# Patient Record
Sex: FEMALE | Race: OTHER | Employment: FULL TIME | URBAN - METROPOLITAN AREA
[De-identification: names, ages, dates, MRNs, and addresses within clinical notes are randomized per-mention and may not be internally consistent; named-entity substitution may affect disease eponyms.]

---

## 2017-05-05 ENCOUNTER — OFFICE VISIT (OUTPATIENT)
Dept: FAMILY MEDICINE CLINIC | Age: 42
End: 2017-05-05

## 2017-05-05 ENCOUNTER — HOSPITAL ENCOUNTER (OUTPATIENT)
Dept: LAB | Age: 42
Discharge: HOME | End: 2017-05-05
Payer: OTHER GOVERNMENT

## 2017-05-05 VITALS
RESPIRATION RATE: 16 BRPM | HEART RATE: 78 BPM | TEMPERATURE: 97.3 F | SYSTOLIC BLOOD PRESSURE: 107 MMHG | BODY MASS INDEX: 23.49 KG/M2 | DIASTOLIC BLOOD PRESSURE: 64 MMHG | OXYGEN SATURATION: 100 % | WEIGHT: 141 LBS | HEIGHT: 65 IN

## 2017-05-05 DIAGNOSIS — N83.201 CYSTS OF BOTH OVARIES: ICD-10-CM

## 2017-05-05 DIAGNOSIS — N83.202 CYSTS OF BOTH OVARIES: ICD-10-CM

## 2017-05-05 DIAGNOSIS — R10.2 PELVIC PAIN: ICD-10-CM

## 2017-05-05 DIAGNOSIS — Z12.39 BREAST CANCER SCREENING: Primary | ICD-10-CM

## 2017-05-05 DIAGNOSIS — R63.4 WEIGHT LOSS: ICD-10-CM

## 2017-05-05 LAB
ALBUMIN SERPL BCP-MCNC: 4.1 G/DL (ref 3.4–5)
ALBUMIN/GLOB SERPL: 1.3 {RATIO} (ref 0.8–1.7)
ALP SERPL-CCNC: 49 U/L (ref 45–117)
ALT SERPL-CCNC: 26 U/L (ref 13–56)
ANION GAP BLD CALC-SCNC: 8 MMOL/L (ref 3–18)
AST SERPL W P-5'-P-CCNC: 15 U/L (ref 15–37)
BASOPHILS # BLD AUTO: 0 K/UL (ref 0–0.06)
BASOPHILS # BLD: 0 % (ref 0–2)
BILIRUB SERPL-MCNC: 0.4 MG/DL (ref 0.2–1)
BUN SERPL-MCNC: 15 MG/DL (ref 7–18)
BUN/CREAT SERPL: 16 (ref 12–20)
CALCIUM SERPL-MCNC: 9.1 MG/DL (ref 8.5–10.1)
CHLORIDE SERPL-SCNC: 103 MMOL/L (ref 100–108)
CHOLEST SERPL-MCNC: 196 MG/DL
CO2 SERPL-SCNC: 29 MMOL/L (ref 21–32)
CREAT SERPL-MCNC: 0.96 MG/DL (ref 0.6–1.3)
DIFFERENTIAL METHOD BLD: ABNORMAL
EOSINOPHIL # BLD: 0.1 K/UL (ref 0–0.4)
EOSINOPHIL NFR BLD: 1 % (ref 0–5)
ERYTHROCYTE [DISTWIDTH] IN BLOOD BY AUTOMATED COUNT: 12.6 % (ref 11.6–14.5)
GLOBULIN SER CALC-MCNC: 3.2 G/DL (ref 2–4)
GLUCOSE SERPL-MCNC: 89 MG/DL (ref 74–99)
HCT VFR BLD AUTO: 41.7 % (ref 35–45)
HDLC SERPL-MCNC: 93 MG/DL (ref 40–60)
HDLC SERPL: 2.1 {RATIO} (ref 0–5)
HGB BLD-MCNC: 13.5 G/DL (ref 12–16)
LDLC SERPL CALC-MCNC: 94.8 MG/DL (ref 0–100)
LIPID PROFILE,FLP: ABNORMAL
LYMPHOCYTES # BLD AUTO: 20 % (ref 21–52)
LYMPHOCYTES # BLD: 1.4 K/UL (ref 0.9–3.6)
MCH RBC QN AUTO: 30.8 PG (ref 24–34)
MCHC RBC AUTO-ENTMCNC: 32.4 G/DL (ref 31–37)
MCV RBC AUTO: 95 FL (ref 74–97)
MONOCYTES # BLD: 0.4 K/UL (ref 0.05–1.2)
MONOCYTES NFR BLD AUTO: 5 % (ref 3–10)
NEUTS SEG # BLD: 4.9 K/UL (ref 1.8–8)
NEUTS SEG NFR BLD AUTO: 74 % (ref 40–73)
PLATELET # BLD AUTO: 318 K/UL (ref 135–420)
PMV BLD AUTO: 10.5 FL (ref 9.2–11.8)
POTASSIUM SERPL-SCNC: 4.3 MMOL/L (ref 3.5–5.5)
PROT SERPL-MCNC: 7.3 G/DL (ref 6.4–8.2)
RBC # BLD AUTO: 4.39 M/UL (ref 4.2–5.3)
SODIUM SERPL-SCNC: 140 MMOL/L (ref 136–145)
TRIGL SERPL-MCNC: 41 MG/DL (ref ?–150)
TSH SERPL DL<=0.05 MIU/L-ACNC: 1.19 UIU/ML (ref 0.36–3.74)
VLDLC SERPL CALC-MCNC: 8.2 MG/DL
WBC # BLD AUTO: 6.7 K/UL (ref 4.6–13.2)

## 2017-05-05 PROCEDURE — 84443 ASSAY THYROID STIM HORMONE: CPT | Performed by: INTERNAL MEDICINE

## 2017-05-05 PROCEDURE — 85025 COMPLETE CBC W/AUTO DIFF WBC: CPT | Performed by: INTERNAL MEDICINE

## 2017-05-05 PROCEDURE — 80053 COMPREHEN METABOLIC PANEL: CPT | Performed by: INTERNAL MEDICINE

## 2017-05-05 PROCEDURE — 80061 LIPID PANEL: CPT | Performed by: INTERNAL MEDICINE

## 2017-05-05 PROCEDURE — 36415 COLL VENOUS BLD VENIPUNCTURE: CPT | Performed by: INTERNAL MEDICINE

## 2017-05-05 NOTE — MR AVS SNAPSHOT
Visit Information Date & Time Provider Department Dept. Phone Encounter #  
 5/5/2017  8:00 AM [id number], [address] [facility] [phone] [id number] Follow-up Instructions Return if symptoms worsen or fail to improve. Follow-up and Disposition History Upcoming Health Maintenance Date Due INFLUENZA AGE 9 TO ADULT 8/1/2017 PAP AKA CERVICAL CYTOLOGY 10/10/2017 DTaP/Tdap/Td series (2 - Td) 3/6/2025 Allergies as of 5/5/2017  Review Complete On: 5/5/2017 By: [id number], [place]  
 No Known Allergies Current Immunizations  Reviewed on 5/21/2015 Name Date Influenza Vaccine 10/10/2014 Influenza Vaccine Split 11/9/2012 Tdap 3/6/2015  3:35 PM, 6/2/2013  7:52 PM  
  
 Not reviewed this visit You Were Diagnosed With   
  
 Codes Comments Breast cancer screening    -  Primary ICD-10-CM: Z12.39 
ICD-9-CM: V76.10 Cysts of both ovaries     ICD-10-CM: N83.201, U66.535 ICD-9-CM: 620.2 Pelvic pain     ICD-10-CM: R10.2 ICD-9-CM: OFZ0631 Weight loss     ICD-10-CM: R63.4 ICD-9-CM: 783.21 Vitals BP Pulse Temp Resp Height(growth percentile) Weight(growth percentile) 107/64 (BP 1 Location: Right arm, BP Patient Position: Sitting) 78 97.3 °F (36.3 °C) (Oral) 16 5' 5\" (1.651 m) 141 lb (64 kg) LMP SpO2 BMI OB Status Smoking Status 04/24/2017 (Approximate) 100% 23.46 kg/m2 Having regular periods Never Smoker Vitals History BMI and BSA Data Body Mass Index Body Surface Area  
 23.46 kg/m 2 1.71 m 2 Preferred Pharmacy Pharmacy Name Phone [facility]  [facility] [phone] Your Updated Medication List  
  
Notice  As of 5/5/2017  9:01 AM  
 You have not been prescribed any medications. Follow-up Instructions Return if symptoms worsen or fail to improve. To-Do List   
 05/05/2017   Lab:  CBC WITH AUTOMATED DIFF   
  
 05/05/2017 Lab:  LIPID PANEL   
  
 05/05/2017 Imaging:  LIZZETTE MAMMO BI SCREENING INCL CAD   
  
 05/05/2017 Lab:  METABOLIC PANEL, COMPREHENSIVE   
  
 05/05/2017 Lab:  TSH 3RD GENERATION   
  
 05/05/2017 Imaging:  US PELV NON OBS  LTD Introducing [facility]! [employer] introduces [employer] patient portal. Now you can access parts of your medical record, email your doctor's office, and request medication refills online. 1. In your internet browser, go to [email or web address] 2. Click on the First Time User? Click Here link in the Sign In box. You will see the New Member Sign Up page. 3. Enter your [employer] Access Code exactly as it appears below. You will not need to use this code after youve completed the sign-up process. If you do not sign up before the expiration date, you must request a new code. · [employer] Access Code: [email or web address] Expires: 8/3/2017  9:01 AM 
 
4. Enter the last four digits of your Social Security Number (xxxx) and Date of Birth (mm/dd/yyyy) as indicated and click Submit. You will be taken to the next sign-up page. 5. Create a [employer] ID. This will be your [employer] login ID and cannot be changed, so think of one that is secure and easy to remember. 6. Create a [employer] password. You can change your password at any time. 7. Enter your Password Reset Question and Answer. This can be used at a later time if you forget your password. 8. Enter your e-mail address. You will receive e-mail notification when new information is available in [address] 9. Click Sign Up. You can now view and download portions of your medical record. 10. Click the Download Summary menu link to download a portable copy of your medical information. If you have questions, please visit the Frequently Asked Questions section of the [employer] website. Remember, [employer] is NOT to be used for urgent needs. For medical emergencies, dial 911. Now available from your iPhone and Android! Please provide this summary of care documentation to your next provider. Your primary care clinician is listed as Memo Hancock. If you have any questions after today's visit, please call [phone].

## 2017-05-05 NOTE — PROGRESS NOTES
Sindy Car is a 37 y.o.  female and presents with     Chief Complaint   Patient presents with    Pelvic Pain    Ovarian Cyst    Weight Loss       Pt is here for follow up. She has mild pelvic pain. She has h/o ovarin cyst that were removed but was told by her Obgyn to have follow up ultrasounds. Pt has lsot some wt and she does exercise but her Father does have HTN,DM ,high chol. Pt has good appetite. Past Medical History:   Diagnosis Date    AMA (advanced maternal age) multigravida 35+ 3/6/2015    Anemia NEC      Past Surgical History:   Procedure Laterality Date    HX CYSTECTOMY      bilateral  dermoid/ovarian cystectomy     No current outpatient prescriptions on file. No current facility-administered medications for this visit. Health Maintenance   Topic Date Due    INFLUENZA AGE 9 TO ADULT  08/01/2017    PAP AKA CERVICAL CYTOLOGY  10/10/2017    DTaP/Tdap/Td series (2 - Td) 03/06/2025     Immunization History   Administered Date(s) Administered    Influenza Vaccine 10/10/2014    Influenza Vaccine Split 11/09/2012    Tdap 06/02/2013, 03/06/2015     Patient's last menstrual period was 04/24/2017 (approximate). Allergies and Intolerances:   No Known Allergies    Family History:   Family History   Problem Relation Age of Onset    Asthma Mother     Hypertension Father     Diabetes Paternal Uncle     Diabetes Paternal Grandmother        Social History:   She  reports that she has never smoked. She does not have any smokeless tobacco history on file. She  reports that she does not drink alcohol.             Review of Systems:   General: negative for - chills, fatigue, fever, weight change  Psych: negative for - anxiety, depression, irritability or mood swings  ENT: negative for - headaches, hearing change, nasal congestion, oral lesions, sneezing or sore throat  Heme/ Lymph: negative for - bleeding problems, bruising, pallor or swollen lymph nodes  Endo: negative for - hot flashes, polydipsia/polyuria or temperature intolerance  Resp: negative for - cough, shortness of breath or wheezing  CV: negative for - chest pain, edema or palpitations  GI: negative for - abdominal pain, change in bowel habits, constipation, diarrhea or nausea/vomiting  : negative for - dysuria, hematuria, incontinence, pelvic pain or vulvar/vaginal symptoms, mild pelvic pain  MSK: negative for - joint pain, joint swelling or muscle pain  Neuro: negative for - confusion, headaches, seizures or weakness  Derm: negative for - dry skin, hair changes, rash or skin lesion changes          Physical:   Vitals:   Vitals:    05/05/17 0826   BP: 107/64   Pulse: 78   Resp: 16   Temp: 97.3 °F (36.3 °C)   TempSrc: Oral   SpO2: 100%   Weight: 141 lb (64 kg)   Height: 5' 5\" (1.651 m)           Exam:   HEENT- atraumatic,normocephalic, awake, oriented, well nourished  Neck - supple,no enlarged lymph nodes, no JVD, no thyromegaly  Chest- CTA, no rhonchi, no crackles  Heart- rrr, no murmurs / gallop/rub  Abdomen- soft,BS+,NT, no hepatosplenomegaly  Ext - no c/c/edema   Neuro- no focal deficits. Power 5/5 all extremities  Skin - warm,dry, no obvious rashes.           Review of Data:   LABS:   Lab Results   Component Value Date/Time    WBC 14.0 05/20/2015 05:30 AM    HGB 10.8 05/21/2015 04:00 AM    HCT 32.4 05/21/2015 04:00 AM    PLATELET 290 43/49/2493 05:30 AM    Hgb, External 11.5 07/10/2012    Hct, External 34.7 07/10/2012    Platelet cnt., External 236 07/10/2012     Lab Results   Component Value Date/Time    Sodium 139 06/03/2014 03:20 AM    Potassium 3.7 06/03/2014 03:20 AM    Chloride 108 06/03/2014 03:20 AM    CO2 24 06/03/2014 03:20 AM    Glucose 98 06/03/2014 03:20 AM    BUN 18 06/03/2014 03:20 AM    Creatinine 0.96 06/03/2014 03:20 AM     Lab Results   Component Value Date/Time    Cholesterol, total 186 08/12/2013 12:00 AM    HDL Cholesterol 90 08/12/2013 12:00 AM    LDL, calculated 88 08/12/2013 12:00 AM Triglyceride 39 08/12/2013 12:00 AM     No results found for: GPT        Impression / Plan:        ICD-10-CM ICD-9-CM    1. Breast cancer screening Z12.39 V76.10 LIZZETTE MAMMO BI SCREENING INCL CAD   2. Cysts of both ovaries N83.201 620.2 US PELV NON OBS  LTD    N83.202     3. Pelvic pain R10.2 [id number] US PELV NON OBS  LTD   4. Weight loss R63.4 783.21 CBC WITH AUTOMATED DIFF      METABOLIC PANEL, COMPREHENSIVE      LIPID PANEL      TSH 3RD GENERATION       Explained to patient risk benefits of the medications. Advised patient to stop meds if having any side effects. Pt verbalized understanding of the instructions. I have discussed the diagnosis with the patient and the intended plan as seen in the above orders. The patient has received an after-visit summary and questions were answered concerning future plans. I have discussed medication side effects and warnings with the patient as well. I have reviewed the plan of care with the patient, accepted their input and they are in agreement with the treatment goals. Reviewed plan of care. Patient has provided input and agrees with goals.     Follow-up Disposition: Not on Kelly Short MD

## 2017-05-05 NOTE — PROGRESS NOTES
Patient hasnt seen a doctor in a few years.   She is requesting a ultrasound on ovaries, and mammogram.

## 2017-05-19 ENCOUNTER — HOSPITAL ENCOUNTER (OUTPATIENT)
Dept: ULTRASOUND IMAGING | Age: 42
Discharge: HOME | End: 2017-05-19
Attending: INTERNAL MEDICINE
Payer: OTHER GOVERNMENT

## 2017-05-19 ENCOUNTER — HOSPITAL ENCOUNTER (OUTPATIENT)
Dept: MAMMOGRAPHY | Age: 42
Discharge: HOME | End: 2017-05-19
Attending: INTERNAL MEDICINE
Payer: OTHER GOVERNMENT

## 2017-05-19 DIAGNOSIS — N83.201 CYSTS OF BOTH OVARIES: ICD-10-CM

## 2017-05-19 DIAGNOSIS — Z12.39 BREAST CANCER SCREENING: ICD-10-CM

## 2017-05-19 DIAGNOSIS — R10.2 PELVIC PAIN: ICD-10-CM

## 2017-05-19 DIAGNOSIS — N83.202 CYSTS OF BOTH OVARIES: ICD-10-CM

## 2017-05-19 PROCEDURE — 77067 SCR MAMMO BI INCL CAD: CPT

## 2017-05-19 PROCEDURE — 76830 TRANSVAGINAL US NON-OB: CPT

## 2018-06-28 ENCOUNTER — HOSPITAL ENCOUNTER (OUTPATIENT)
Dept: MAMMOGRAPHY | Age: 43
Discharge: HOME | End: 2018-06-28
Attending: INTERNAL MEDICINE
Payer: OTHER GOVERNMENT

## 2018-06-28 DIAGNOSIS — Z12.31 ENCOUNTER FOR SCREENING MAMMOGRAM FOR BREAST CANCER: ICD-10-CM

## 2018-06-28 PROCEDURE — 77067 SCR MAMMO BI INCL CAD: CPT

## 2019-01-09 ENCOUNTER — OFFICE VISIT (OUTPATIENT)
Dept: FAMILY MEDICINE CLINIC | Age: 44
End: 2019-01-09

## 2019-01-09 VITALS
DIASTOLIC BLOOD PRESSURE: 61 MMHG | OXYGEN SATURATION: 100 % | HEART RATE: 70 BPM | RESPIRATION RATE: 17 BRPM | BODY MASS INDEX: 23.49 KG/M2 | SYSTOLIC BLOOD PRESSURE: 119 MMHG | TEMPERATURE: 98.3 F | WEIGHT: 141 LBS | HEIGHT: 65 IN

## 2019-01-09 DIAGNOSIS — M67.432 GANGLION CYST OF WRIST, LEFT: Primary | ICD-10-CM

## 2019-01-09 NOTE — PROGRESS NOTES
1. Have you been to the ER, urgent care clinic since your last visit? Hospitalized since your last visit? No    2. Have you seen or consulted any other health care providers outside of the [address] [place] [address] since your last visit? Include any pap smears or colon screening.  No

## 2019-01-10 NOTE — PROGRESS NOTES
Sol Hoffmann is a 44 y.o.  female and presents with     Chief Complaint   Patient presents with    Swelling     wrist       Pt noticed swelling on the flexor aspect of left wrist .  got worried and asked her to evaluated ib the office. Pt denies any pain. She just wants to make sure it is not cancerous. Past Medical History:   Diagnosis Date    AMA (advanced maternal age) multigravida 35+ 3/6/2015    Anemia NEC      Past Surgical History:   Procedure Laterality Date    HX CYSTECTOMY      bilateral  dermoid/ovarian cystectomy     No current outpatient medications on file. No current facility-administered medications for this visit. Health Maintenance   Topic Date Due    PAP AKA CERVICAL CYTOLOGY  10/10/2017    Influenza Age 5 to Adult  08/01/2018    DTaP/Tdap/Td series (2 - Td) 03/06/2025     Immunization History   Administered Date(s) Administered    Influenza Vaccine 10/10/2014    Influenza Vaccine Split 11/09/2012    Tdap 06/02/2013, 03/06/2015     Patient's last menstrual period was 01/06/2019. Allergies and Intolerances:   No Known Allergies    Family History:   Family History   Problem Relation Age of Onset    Asthma Mother     Hypertension Father     Diabetes Paternal Uncle     Diabetes Paternal Grandmother        Social History:   She  reports that  has never smoked. she has never used smokeless tobacco.  She  reports that she does not drink alcohol.             Review of Systems:   General: negative for - chills, fatigue, fever, weight change  Psych: negative for - anxiety, depression, irritability or mood swings  ENT: negative for - headaches, hearing change, nasal congestion, oral lesions, sneezing or sore throat  Heme/ Lymph: negative for - bleeding problems, bruising, pallor or swollen lymph nodes  Endo: negative for - hot flashes, polydipsia/polyuria or temperature intolerance  Resp: negative for - cough, shortness of breath or wheezing  CV: negative for - chest pain, edema or palpitations  GI: negative for - abdominal pain, change in bowel habits, constipation, diarrhea or nausea/vomiting  : negative for - dysuria, hematuria, incontinence, pelvic pain or vulvar/vaginal symptoms  MSK: negative for - joint pain, joint swelling or muscle pain  Neuro: negative for - confusion, headaches, seizures or weakness  Derm: negative for - dry skin, hair changes, rash or skin lesion changes          Physical:   Vitals:   Vitals:    01/09/19 1152   BP: 119/61   Pulse: 70   Resp: 17   Temp: 98.3 °F (36.8 °C)   TempSrc: Oral   SpO2: 100%   Weight: 141 lb (64 kg)   Height: 5' 5\" (1.651 m)           Exam:   HEENT- atraumatic,normocephalic, awake, oriented, well nourished  Neck - supple,no enlarged lymph nodes, no JVD, no thyromegaly  Chest- CTA, no rhonchi, no crackles  Heart- rrr, no murmurs / gallop/rub  Abdomen- soft,BS+,NT, no hepatosplenomegaly  Ext - no c/c/edema , soft swelling on the flexor aspect of left wrist near the wrist joint- ganglion  Neuro- no focal deficits. Power 5/5 all extremities  Skin - warm,dry, no obvious rashes.           Review of Data:   LABS:   Lab Results   Component Value Date/Time    WBC 6.7 05/05/2017 09:07 AM    HGB 13.5 05/05/2017 09:07 AM    HCT 41.7 05/05/2017 09:07 AM    PLATELET 275 70/01/0567 09:07 AM    Hgb, External 11.5 07/10/2012    Hct, External 34.7 07/10/2012    Platelet cnt., External 236 07/10/2012     Lab Results   Component Value Date/Time    Sodium 140 05/05/2017 09:07 AM    Potassium 4.3 05/05/2017 09:07 AM    Chloride 103 05/05/2017 09:07 AM    CO2 29 05/05/2017 09:07 AM    Glucose 89 05/05/2017 09:07 AM    BUN 15 05/05/2017 09:07 AM    Creatinine 0.96 05/05/2017 09:07 AM     Lab Results   Component Value Date/Time    Cholesterol, total 196 05/05/2017 09:07 AM    HDL Cholesterol 93 (H) 05/05/2017 09:07 AM    LDL, calculated 94.8 05/05/2017 09:07 AM    Triglyceride 41 05/05/2017 09:07 AM     No results found for: GPT        Impression / Plan:        ICD-10-CM ICD-9-CM    1. Ganglion cyst of wrist, left M67.432 727.41      reassurred pt to just observe since it is benign and not causing any symptoms      Explained to patient risk benefits of the medications. Advised patient to stop meds if having any side effects. Pt verbalized understanding of the instructions. I have discussed the diagnosis with the patient and the intended plan as seen in the above orders. The patient has received an after-visit summary and questions were answered concerning future plans. I have discussed medication side effects and warnings with the patient as well. I have reviewed the plan of care with the patient, accepted their input and they are in agreement with the treatment goals. Reviewed plan of care. Patient has provided input and agrees with goals.     Follow-up Disposition: Not on Torrie Winters MD

## 2020-10-02 ENCOUNTER — HOSPITAL ENCOUNTER (OUTPATIENT)
Dept: MAMMOGRAPHY | Age: 45
Discharge: HOME | End: 2020-10-02
Attending: FAMILY MEDICINE
Payer: COMMERCIAL

## 2020-10-02 DIAGNOSIS — Z12.31 VISIT FOR SCREENING MAMMOGRAM: ICD-10-CM

## 2020-10-02 PROCEDURE — 77067 SCR MAMMO BI INCL CAD: CPT

## 2020-10-02 PROCEDURE — 77063 BREAST TOMOSYNTHESIS BI: CPT

## 2023-01-18 ENCOUNTER — APPOINTMENT (OUTPATIENT)
Dept: URBAN - METROPOLITAN AREA CLINIC 309 | Age: 48
Setting detail: DERMATOLOGY
End: 2023-01-19

## 2023-01-18 DIAGNOSIS — L91.0 HYPERTROPHIC SCAR: ICD-10-CM

## 2023-01-18 PROCEDURE — OTHER INTRALESIONAL KENALOG: OTHER

## 2023-01-18 PROCEDURE — 11900 INJECT SKIN LESIONS </W 7: CPT

## 2023-01-18 PROCEDURE — OTHER COUNSELING: OTHER

## 2023-01-18 PROCEDURE — OTHER REASSURANCE: OTHER

## 2023-01-18 PROCEDURE — OTHER MIPS QUALITY: OTHER

## 2023-01-18 PROCEDURE — OTHER PHOTO-DOCUMENTATION: OTHER

## 2023-01-18 ASSESSMENT — LOCATION SIMPLE DESCRIPTION DERM
LOCATION SIMPLE: RIGHT NOSE
LOCATION SIMPLE: RIGHT NOSE

## 2023-01-18 ASSESSMENT — LOCATION DETAILED DESCRIPTION DERM
LOCATION DETAILED: RIGHT NASAL SIDEWALL
LOCATION DETAILED: RIGHT NASAL SIDEWALL

## 2023-01-18 ASSESSMENT — LOCATION ZONE DERM
LOCATION ZONE: NOSE
LOCATION ZONE: NOSE

## 2023-01-18 ASSESSMENT — SCAR ASSESSEMENT OVERALL: SCAR ASSESSMENT: 3.5 (NODULAR SCAR, ERYTHEMA)

## 2023-01-18 NOTE — HPI: SCAR (KELOIDS)
How Severe Are They?: mild
Is This A New Presentation, Or A Follow-Up?: Scar
Additional History: Pt is in today regarding a possible keloid on her nose at the site of a piercing. Patient got the piercing on thanksgiving, and about a month afterwards she noticed a keloid. Patient has tried saline spray three times a day, and a chamomile soak, neither helped

## 2023-01-18 NOTE — PROCEDURE: REASSURANCE
Additional Notes (Optional): Recommended removing the piercing and not re-piercing the nasal ala; she's amenable to this plan and removed the piercing today
Detail Level: Detailed
Hide Additional Notes?: No

## 2023-02-15 ENCOUNTER — APPOINTMENT (OUTPATIENT)
Dept: URBAN - METROPOLITAN AREA CLINIC 309 | Age: 48
Setting detail: DERMATOLOGY
End: 2023-02-15

## 2023-02-15 DIAGNOSIS — L91.0 HYPERTROPHIC SCAR: ICD-10-CM

## 2023-02-15 PROCEDURE — OTHER REASSURANCE: OTHER

## 2023-02-15 PROCEDURE — 99213 OFFICE O/P EST LOW 20 MIN: CPT

## 2023-02-15 PROCEDURE — OTHER COUNSELING: OTHER

## 2023-02-15 PROCEDURE — OTHER MIPS QUALITY: OTHER

## 2023-02-15 ASSESSMENT — LOCATION SIMPLE DESCRIPTION DERM: LOCATION SIMPLE: NOSE

## 2023-02-15 ASSESSMENT — SCAR ASSESSEMENT OVERALL: SCAR ASSESSMENT: 1 (FLAT SCAR, NO ERYTHEMA)

## 2023-02-15 ASSESSMENT — LOCATION DETAILED DESCRIPTION DERM: LOCATION DETAILED: NASAL DORSUM

## 2023-02-15 ASSESSMENT — LOCATION ZONE DERM: LOCATION ZONE: NOSE

## 2023-02-15 NOTE — PROCEDURE: REASSURANCE
Detail Level: Detailed
Hide Additional Notes?: No
Additional Notes (Optional): Lesion is flat and barely noticeable; pt defers ILTAC injections today and is happy with the result; instructed her to RTC if the keloid enlarges again; she's amenable to this plan

## 2023-11-15 ENCOUNTER — HOSPITAL ENCOUNTER (OUTPATIENT)
Dept: WOMENS IMAGING | Facility: HOSPITAL | Age: 48
Discharge: HOME | End: 2023-11-18
Payer: COMMERCIAL

## 2023-11-15 DIAGNOSIS — Z12.31 VISIT FOR SCREENING MAMMOGRAM: ICD-10-CM

## 2023-11-15 PROCEDURE — 77063 BREAST TOMOSYNTHESIS BI: CPT

## 2024-11-20 ENCOUNTER — HOSPITAL ENCOUNTER (OUTPATIENT)
Dept: WOMENS IMAGING | Facility: HOSPITAL | Age: 49
Discharge: HOME | End: 2024-11-23
Payer: COMMERCIAL

## 2024-11-20 DIAGNOSIS — Z12.31 ENCOUNTER FOR SCREENING MAMMOGRAM FOR MALIGNANT NEOPLASM OF BREAST: ICD-10-CM

## 2024-11-20 PROCEDURE — 77063 BREAST TOMOSYNTHESIS BI: CPT
